# Patient Record
Sex: MALE | Race: BLACK OR AFRICAN AMERICAN | Employment: UNEMPLOYED
[De-identification: names, ages, dates, MRNs, and addresses within clinical notes are randomized per-mention and may not be internally consistent; named-entity substitution may affect disease eponyms.]

---

## 2023-05-06 ENCOUNTER — APPOINTMENT (OUTPATIENT)
Facility: HOSPITAL | Age: 53
End: 2023-05-06
Payer: COMMERCIAL

## 2023-05-06 ENCOUNTER — HOSPITAL ENCOUNTER (EMERGENCY)
Facility: HOSPITAL | Age: 53
Discharge: HOME | End: 2023-05-06
Attending: EMERGENCY MEDICINE
Payer: COMMERCIAL

## 2023-05-06 VITALS
RESPIRATION RATE: 18 BRPM | BODY MASS INDEX: 24.36 KG/M2 | DIASTOLIC BLOOD PRESSURE: 72 MMHG | WEIGHT: 174 LBS | TEMPERATURE: 98.7 F | OXYGEN SATURATION: 98 % | SYSTOLIC BLOOD PRESSURE: 116 MMHG | HEART RATE: 84 BPM | HEIGHT: 71 IN

## 2023-05-06 DIAGNOSIS — H10.9 CONJUNCTIVITIS OF RIGHT EYE, UNSPECIFIED CONJUNCTIVITIS TYPE: Primary | ICD-10-CM

## 2023-05-06 DIAGNOSIS — H11.31 SUBCONJUNCTIVAL HEMATOMA, RIGHT: ICD-10-CM

## 2023-05-06 DIAGNOSIS — R51.9 ACUTE NONINTRACTABLE HEADACHE, UNSPECIFIED HEADACHE TYPE: ICD-10-CM

## 2023-05-06 LAB
CHP ED QC CHECK: YES
GLUCOSE BLD STRIP.AUTO-MCNC: 116 MG/DL (ref 70–110)

## 2023-05-06 PROCEDURE — 2500000003 HC RX 250 WO HCPCS: Performed by: PHYSICIAN ASSISTANT

## 2023-05-06 PROCEDURE — 6370000000 HC RX 637 (ALT 250 FOR IP): Performed by: PHYSICIAN ASSISTANT

## 2023-05-06 PROCEDURE — 70450 CT HEAD/BRAIN W/O DYE: CPT

## 2023-05-06 PROCEDURE — 99284 EMERGENCY DEPT VISIT MOD MDM: CPT

## 2023-05-06 PROCEDURE — 82962 GLUCOSE BLOOD TEST: CPT

## 2023-05-06 RX ORDER — CIPROFLOXACIN HYDROCHLORIDE 3.5 MG/ML
2 SOLUTION/ DROPS TOPICAL EVERY 4 HOURS
Qty: 5 ML | Refills: 0 | Status: SHIPPED | OUTPATIENT
Start: 2023-05-06 | End: 2023-05-13

## 2023-05-06 RX ORDER — PROPARACAINE HYDROCHLORIDE 5 MG/ML
1 SOLUTION/ DROPS OPHTHALMIC
Status: COMPLETED | OUTPATIENT
Start: 2023-05-06 | End: 2023-05-06

## 2023-05-06 RX ORDER — IBUPROFEN 600 MG/1
600 TABLET ORAL
Status: DISCONTINUED | OUTPATIENT
Start: 2023-05-06 | End: 2023-05-06 | Stop reason: HOSPADM

## 2023-05-06 RX ADMIN — FLUORESCEIN SODIUM 1 MG: 1 STRIP OPHTHALMIC at 16:28

## 2023-05-06 RX ADMIN — PROPARACAINE HYDROCHLORIDE 1 DROP: 5 SOLUTION/ DROPS OPHTHALMIC at 16:28

## 2023-05-06 ASSESSMENT — PAIN DESCRIPTION - FREQUENCY: FREQUENCY: INTERMITTENT

## 2023-05-06 ASSESSMENT — TONOMETRY
OS_IOP_MMHG: 17
OD_IOP_MMHG: 18
IOP_HANDHELD: 1

## 2023-05-06 ASSESSMENT — VISUAL ACUITY
OU: 1
OD: 20/50
OU: 20/25
OS: 20/70

## 2023-05-06 ASSESSMENT — PAIN DESCRIPTION - LOCATION: LOCATION: EYE

## 2023-05-06 ASSESSMENT — PAIN SCALES - GENERAL: PAINLEVEL_OUTOF10: 4

## 2023-05-06 ASSESSMENT — PAIN - FUNCTIONAL ASSESSMENT: PAIN_FUNCTIONAL_ASSESSMENT: 0-10

## 2023-05-06 ASSESSMENT — PAIN DESCRIPTION - ORIENTATION: ORIENTATION: RIGHT

## 2023-05-06 NOTE — ED PROVIDER NOTES
[facility] EMERGENCY DEPT  EMERGENCY DEPARTMENT ENCOUNTER       Pt Name: Sweetie Merritt  MRN: [id number]  [place] 1970  Date of evaluation: 5/6/2023  Provider: Stephanie Thompson PA-C   PCP: Tiburcio Odom MD  Note Started: 3:39 PM 5/6/23     CHIEF COMPLAINT       Chief Complaint   Patient presents with    Eye Pain        HISTORY OF PRESENT ILLNESS: 1 or more elements      History From: Patient  HPI Limitations: None     Sweetie Merritt is a 47 y.o. male who presents to the emergency department with a chief complaint of right thigh pain onset this morning when he woke up. Associated headache behind his right eye also started today. Patient describes a grittiness sensation to the surface of his right eye. He denies any known injury to the eye or known foreign body exposure. He also noticed a red area to the white part of his eye when he looked in the mirror this morning. States that his vision is slightly blurry. He does wear glasses for driving and reading. He denies fever, neck pain, trauma, history of diabetes or hypertension. He denies photophobia. He does have a family history of diabetes. Nursing Notes were all reviewed and agreed with or any disagreements were addressed in the HPI. PAST HISTORY     Past Medical History:  History reviewed. No pertinent past medical history. Past Surgical History:  History reviewed. No pertinent surgical history. Family History:  History reviewed. No pertinent family history. Social History:  Social History     Socioeconomic History    Marital status: Single     Spouse name: None    Number of children: None    Years of education: None    Highest education level: None   Tobacco Use    Smoking status: Never    Smokeless tobacco: Never   Vaping Use    Vaping Use: Never used   Substance and Sexual Activity    Alcohol use: Not Currently    Drug use: Never       Allergies:   Allergies   Allergen Reactions    Azithromycin Anaphylaxis    Bee Venom Anaphylaxis

## 2023-05-06 NOTE — ED TRIAGE NOTES
Pt complains of right pain and redness. Denies FB or chemical exposure. States his vision is a little blurry. Pt reports waking with a headache on the right side.

## 2023-05-06 NOTE — DISCHARGE INSTRUCTIONS
Continue ibuprofen as needed for headache, follow-up with your eye doctor, return to the emergency department if you develop any significantly worsening symptoms.